# Patient Record
(demographics unavailable — no encounter records)

---

## 2024-10-21 NOTE — HISTORY OF PRESENT ILLNESS
[FreeTextEntry1] : 66F presents for follow up   H/o OAB  Refractory to oxybutynin, vesicare  s/p Botox (100 units, 9/9)   10/21/24 Sub-par improvement with Botox PVR: 60   DTF: 20 -> 15 NTF: 4 -> 3-4 PPD: 0, unchanged

## 2024-10-21 NOTE — ASSESSMENT
[FreeTextEntry1] :  Ms. Islas presents for follow up  h/o OAB  refractory to myrbetriq, oxybutynin Sub-par benefit with Botox 100, PVR: 60  discussed repeat botox vs. SNM patient would like to proceed with repeat Botox  Recommendations -plan for Botox 100 units in early December   I have spent 30 minutes of time on the encounter which excludes teaching and separately reported services

## 2025-01-13 NOTE — PHYSICAL EXAM
[General Appearance - Well Developed] : well developed [General Appearance - Well Nourished] : well nourished [Abdomen Soft] : soft [Abdomen Tenderness] : non-tender [de-identified] : PVR: 0

## 2025-01-13 NOTE — ASSESSMENT
[FreeTextEntry1] :  Ms. Islas presents for follow up  h/o OAB  refractory to myrbetriq, oxybutynin Excellent symptom control with Bladder Botox injection #2  3 mo after injection #1, PVR: 0  Recommendations -RTC 3 mo -plan for Botox 200 units in the future   I have spent 20 minutes of time on the encounter which excludes teaching and separately reported services

## 2025-01-13 NOTE — HISTORY OF PRESENT ILLNESS
[FreeTextEntry1] : 66F presents for follow up   H/o OAB  Refractory to oxybutynin, vesicare  s/p Botox (100 units, 9/9)   10/21/24 Sub-par improvement with Botox PVR: 60  12/9 Botox #2 (100 units)  1/13/25 Excellent symptom control No adverse effects, PVR: 0 DTF: 20 -> 15 ->8 NTF: 4 -> 3-4 ->1 PPD: 0, unchanged

## 2025-04-14 NOTE — PHYSICAL EXAM
[General Appearance - Well Developed] : well developed [General Appearance - Well Nourished] : well nourished [Abdomen Soft] : soft [Abdomen Tenderness] : non-tender [de-identified] : PVR: 0

## 2025-04-14 NOTE — HISTORY OF PRESENT ILLNESS
[FreeTextEntry1] : 66F presents for follow up   H/o OAB  Refractory to oxybutynin, vesicare  s/p Botox (100 units, 9/9)   10/21/24 Sub-par improvement with Botox PVR: 60  12/9 Botox #2 (100 units)  1/13/25 Excellent symptom control No adverse effects, PVR: 0 DTF: 20 -> 15 ->8 NTF: 4 -> 3-4 ->1 PPD: 0, unchanged  4/14/25 Symptoms still controlled, but urgency is returning interested in planning for repeat injection

## 2025-04-14 NOTE — ASSESSMENT
[FreeTextEntry1] :  Ms. Islas presents for follow up  h/o OAB  refractory to myrbetriq, oxybutynin Excellent symptom control with Bladder Botox injection #2  urgency returning  4 mo after injection #1  Recommendations -plan for Botox 200 units, auth placed   I have spent 20 minutes of time on the encounter which excludes teaching and separately reported services

## 2025-06-23 NOTE — PHYSICAL EXAM
[General Appearance - Well Developed] : well developed [General Appearance - Well Nourished] : well nourished [Abdomen Soft] : soft [Abdomen Tenderness] : non-tender [de-identified] : PVR: 0

## 2025-06-23 NOTE — ASSESSMENT
[FreeTextEntry1] :  Ms. Islas presents for follow up  h/o OAB  refractory to myrbetriq, oxybutynin Excellent symptom control with Bladder Botox injection #3, 200 units  urgency 80% improved, PVR: 0  Recommendations -patient to call when ready to schedule repeat Botox injection    I have spent 20 minutes of time on the encounter which excludes teaching and separately reported services

## 2025-06-23 NOTE — HISTORY OF PRESENT ILLNESS
[FreeTextEntry1] : 66F presents for follow up   H/o OAB  Refractory to oxybutynin, vesicare  s/p Botox (100 units, 9/9)   10/21/24 Sub-par improvement with Botox PVR: 60  12/9 Botox #2 (100 units)  1/13/25 Excellent symptom control No adverse effects, PVR: 0 DTF: 20 -> 15 ->8 NTF: 4 -> 3-4 ->1 PPD: 0, unchanged  4/14/25 Symptoms still controlled, but urgency is returning interested in planning for repeat injection  5/19/25 Botox #3 (200 units)  6/23/25 Excellent symptom control, PVR: 0 Has some residual urgency DTF: 8-10 NTF: 0-1 PPD: 0